# Patient Record
(demographics unavailable — no encounter records)

---

## 2024-10-15 NOTE — HISTORY OF PRESENT ILLNESS
[Currently Experiencing] : currently [Fatigue] : fatigue [Skin Lesions] : skin lesions [Arthralgias] : arthralgias [Decreased ROM] : decreased range of motion [FreeTextEntry1] : 50 year old female here for evaluation of joint pains   In 2024 she developed pain and swelling in the hands that happened all of a sudden upon waking up. Her hands felt numb, swelling of the wrist and hands with inability to close her hands and pain that radiated from the elbow into the hands. The swelling lasted appr. 1 week and then subsided. However, now when she is sleeping on either side, she experiences numbness in the hands extending from the hand going upwards but dissipates when she turns sides. Swelling was not associated with redness or warmth. No such episodes either before or after this occurrence. At that time, she was taking turmeric supplements. She stopped all her medications.   She has a h/o psoriasis - uses topical ointments. No h/o dactylitis or enthesitis. No h/o uveitis or IBD.   She has a torn ligament with plantar fasciitis in the L foot that is being treated by her brother-in-law who is a podiatric surgeon in [place]. She was in a boot for 2 months and therapy.   Denies any constitutional symptoms - fevers, chills, malaise, weight loss, myalgias. No h/o rashes, uveitis, hair loss, headaches, mucosal ulcers, Raynaud's, sicca symptoms, GI or  issues, serositis, pleuritis, pericarditis, weakness. No h/o blood clots.  A1 - first child with trisomy 15 with spontaneous . 2nd pregnancy at high risk with normal delivery at age 43.   [Anorexia] : no anorexia [Weight Loss] : no weight loss [Malaise] : no malaise [Fever] : no fever [Chills] : no chills [Depression] : no depression [Malar Facial Rash] : no malar facial rash [Skin Nodules] : no skin nodules [Oral Ulcers] : no oral ulcers [Cough] : no cough [Dry Mouth] : no dry mouth [Dysphonia] : no dysphonia [Dysphagia] : no dysphagia [Shortness of Breath] : no shortness of breath [Chest Pain] : no chest pain [Joint Swelling] : no joint swelling [Joint Warmth] : no joint warmth [Joint Deformity] : no joint deformity [Morning Stiffness] : no morning stiffness [Falls] : no falls [Difficulty Standing] : no difficulty standing [Difficulty Walking] : no difficulty walking [Dyspnea] : no dyspnea [Myalgias] : no myalgias [Muscle Weakness] : no muscle weakness [Muscle Spasms] : no muscle spasms [Muscle Cramping] : no muscle cramping [Visual Changes] : no visual changes [Eye Pain] : no eye pain [Eye Redness] : no eye redness [Dry Eyes] : no dry eyes

## 2024-10-15 NOTE — PHYSICAL EXAM
[General Appearance - Alert] : alert [General Appearance - In No Acute Distress] : in no acute distress [Sclera] : the sclera and conjunctiva were normal [PERRL With Normal Accommodation] : pupils were equal in size, round, and reactive to light [Extraocular Movements] : extraocular movements were intact [Outer Ear] : the ears and nose were normal in appearance [Oropharynx] : the oropharynx was normal [Neck Appearance] : the appearance of the neck was normal [Neck Cervical Mass (___cm)] : no neck mass was observed [Jugular Venous Distention Increased] : there was no jugular-venous distention [Thyroid Nodule] : there were no palpable thyroid nodules [Thyroid Diffuse Enlargement] : the thyroid was not enlarged [] : no respiratory distress [Auscultation Breath Sounds / Voice Sounds] : lungs were clear to auscultation bilaterally [Heart Rate And Rhythm] : heart rate was normal and rhythm regular [Heart Sounds] : normal S1 and S2 [Heart Sounds Gallop] : no gallops [Murmurs] : no murmurs [Heart Sounds Pericardial Friction Rub] : no pericardial rub [Full Pulse] : the pedal pulses are present [Edema] : there was no peripheral edema [Cervical Lymph Nodes Enlarged Posterior Bilaterally] : posterior cervical [Cervical Lymph Nodes Enlarged Anterior Bilaterally] : anterior cervical [Supraclavicular Lymph Nodes Enlarged Bilaterally] : supraclavicular [Axillary Lymph Nodes Enlarged Bilaterally] : axillary [No CVA Tenderness] : no ~M costovertebral angle tenderness [No Spinal Tenderness] : no spinal tenderness [Abnormal Walk] : normal gait [Nail Clubbing] : no clubbing  or cyanosis of the fingernails [Musculoskeletal - Swelling] : no joint swelling seen [Motor Tone] : muscle strength and tone were normal [FreeTextEntry1] : scattered psoriatic lesions on the extremities  [No Focal Deficits] : no focal deficits [Oriented To Time, Place, And Person] : oriented to person, place, and time [Impaired Insight] : insight and judgment were intact [Affect] : the affect was normal

## 2024-10-15 NOTE — ASSESSMENT
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 C/o burning pain in the hands with compressive symptoms suggestive of carpal tunnel syndrome although her presentation was quite atypical. Also c/o radiculopathy extending into the forearm. Will get US of the elbow and wrist to evaluate for nerve compression w/wo synovitis. If negative work up may need neurology evaluation with EMG.   2. Scattered psoriatic lesions in the extremities, well controlled with topical treatment. She does not have symptoms of PsA at this time.   3. 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 Will also check APS serologies and anti-thyroid antibodies.   Follow up in 2 months

## 2024-10-15 NOTE — REVIEW OF SYSTEMS
[As Noted in HPI] : as noted in HPI [Arthralgias] : arthralgias [Joint Swelling] : joint swelling [Joint Stiffness] : joint stiffness [Limb Pain] : limb pain [Negative] : Heme/Lymph [Limb Swelling] : no limb swelling